# Patient Record
Sex: FEMALE | Race: WHITE
[De-identification: names, ages, dates, MRNs, and addresses within clinical notes are randomized per-mention and may not be internally consistent; named-entity substitution may affect disease eponyms.]

---

## 2019-10-01 NOTE — NUR
S/L TO LEFT WRIST REMOVED PER PATIENT'S REQUEST. NO ERYTHEMA OR INFILTRATION
TO SITE, DRSG APPLIED. NEW IV CATHETER#22 INSERTED TO LFA WITH GOOD BLD
RETURNED AND FLUSEHD WELL. PLAN OF CARE DISCUSSED. REFUSED PAIN MEDS OFFERED
STATED MAYBE LATER.

## 2019-10-01 NOTE — NUR
RECEIVED PATIENT IN BED AWAKE, ALERT AND ORIENTED WITH NO C/O BACKPAIN AT THIS
TIME, PATIENT MEDICATED EARLIER BY AM SHIFT FOR PAIN. BREATHING EASY AND
NONLABOR SATTING AT99% RA. ABDOMEN ROUND AND NOTENDER WITH ACTIVE BS, WILL
CONTINUE TO MONITOR. CALL LIGHT WITHIN REACH.

## 2019-10-01 NOTE — NUR
RECEIVED FROM ER [facility], ABLE TO WALK FROM THE HALLWAY ASSISTED BY 2
NURSES TO BED. ADMITS FOR INTRACTABLE BACK PAIN. STATED BACK PAIN SINCE SUNDAY
WORSEN TODAY, PAIN IS 10/10 ON PAIN SCALE ON MOVEMENT. KEPT COMFORTABLE IN
BED. C/O OF PAIN TO LT WRIST IV SITE, REQUEST TO HAVE IT REMOVED. V/S CHECKED
STABLE, /57, HR 91, RR 20, O2SAT 97% ON ROOM AIR. ORIENTING TO ROOM
ENVIRONMENT. CALL LIGHT PLACED WITHIN EASY REACH, SIDERAILS UP X2.

## 2019-10-01 NOTE — NUR
PTPT BIB [facility] ACL UNIT, PER MEDIC PT HAS BEEN HAVING LOWER BACK PAIN "SINCE
SUNDAY" BUT UPON GETTING OUT OF BED THIS MORNING PT HEARD A "POP" IN HER BED.
PT IS C/O LOWER BACK PAIN RADIDATING TO BILATERAL LOWER EXTREMITIES. PT IS
AAOX4, NO DISTRESS NOTED, RESP E/U, +PMSC, FULL ROM WITH INCREASED PAIN TO
LOWER BACK AND BILATERAL LOWER EXTREMITIES, SKIN INTACT PINK WARM AND DRY. MSE
COMPLETED BY DR HEWITT. PT PLACED ON MONITOR, VSS, BED IN LOWEST POSITION FOR
SAFETY AND CALL SWIFT WITHIN REACH.

## 2019-10-01 NOTE — NUR
PT UNABLE TO WALK MORE THAN A COUPLE STEPS DURING ROAD TEST. PT UNABLE TO STAND
UP STRAIGHT. PT NEEDED ASSISTANCE OUT OF BED AND BEING PLACE BACK ONTO BED. PT
C/O 10/10 PAIN DURING ROAD TEST.

## 2019-10-02 NOTE — NUR
AT 1100 - RECEIVED DISCHARGE ORDERS. PATIENT MAY SHOWER BEFORE DISCHARGE.
AT 1145 - PATIENT HAS SHOWERED. IV CATHETER REMOVED INTACT. PATIENT IS DRESSED
AND SITTING IN CHAIR.
AT 1205 - PRINTED DISCHARGE INSTRUCTIONS GIVEN AND EXPLAINED TO PATIENT.
PRESCRIPTION AND WORK NOTED PROVIDED.
DISCHARGED HOME WITH FAMILY. TAKEN TO CAR IN WHEELCHAIR BY NURSE.

## 2019-10-02 NOTE — NUR
PATIENT SLEPT AT LONG INTERVALS, NO INDICATION OF BACKAPIN AND DISCOMFORT
NOTED THE ENTIRE SHIFT. ALL NEEDS ATTENDED.

## 2019-10-02 NOTE — NUR
PHYSICAL THERAPY NOTE
ATTEMPTED FOR FOLLOW UP PHYSICAL THERAPY SESSION. PATIENT D/C'd BEFORE
TREATMENT.

## 2019-10-02 NOTE — NUR
AT 0705 - RECEIVED PATIENT FROM NIGHT NURSE. SLEEPING. RESPIRATIONS REGULAR.
AT 0740 - SITTING UP IN BED EATING BREAKFAST. REPORTS FEELING A LITTLE BETTER
TODAY. REPORTS THAT SHE STIL HAS PAIN IN LOWER BACK.  ABLE TO AMBULATE TO
BATHROOM.
AT 0810 - MEIDCATED WITH TORADOL AS PER EMAR.
AT 0930 - PATIENT APPEARS MUCH MORE COMFORTABLE.

## 2020-06-25 ENCOUNTER — HOSPITAL ENCOUNTER (EMERGENCY)
Dept: HOSPITAL 1 - ED | Age: 30
LOS: 1 days | Discharge: HOME | End: 2020-06-26
Payer: MEDICAID

## 2020-06-25 VITALS — BODY MASS INDEX: 43.4 KG/M2 | WEIGHT: 293 LBS | HEIGHT: 69 IN

## 2020-06-25 DIAGNOSIS — O99.211: ICD-10-CM

## 2020-06-25 DIAGNOSIS — O26.891: Primary | ICD-10-CM

## 2020-06-25 DIAGNOSIS — Z3A.01: ICD-10-CM

## 2020-06-25 DIAGNOSIS — M54.30: ICD-10-CM

## 2020-06-25 LAB
ALBUMIN SERPL-MCNC: 3.1 G/DL (ref 3.4–5)
ALP SERPL-CCNC: 99 U/L (ref 46–116)
ALT SERPL-CCNC: 33 U/L (ref 14–59)
AST SERPL-CCNC: 22 U/L (ref 15–37)
BASOPHILS NFR BLD: 2.5 % (ref 0–2)
BILIRUB SERPL-MCNC: 0.3 MG/DL (ref 0.2–1)
BUN SERPL-MCNC: 9 MG/DL (ref 7–18)
CALCIUM SERPL-MCNC: 8.5 MG/DL (ref 8.5–10.1)
CHLORIDE SERPL-SCNC: 103 MMOL/L (ref 98–107)
CO2 SERPL-SCNC: 26.9 MMOL/L (ref 21–32)
CREAT SERPL-MCNC: 0.7 MG/DL (ref 0.6–1)
ERYTHROCYTE [DISTWIDTH] IN BLOOD BY AUTOMATED COUNT: 12.9 % (ref 11.5–14.5)
GFR SERPLBLD BASED ON 1.73 SQ M-ARVRAT: > 60 ML/MIN
GLUCOSE SERPL-MCNC: 110 MG/DL (ref 74–106)
PLATELET # BLD: 221 X10^3MCL (ref 130–400)
POTASSIUM SERPL-SCNC: 3.8 MMOL/L (ref 3.5–5.1)
PROT SERPL-MCNC: 6.9 G/DL (ref 6.4–8.2)
SODIUM SERPL-SCNC: 138 MMOL/L (ref 136–145)

## 2020-06-26 VITALS — DIASTOLIC BLOOD PRESSURE: 70 MMHG | SYSTOLIC BLOOD PRESSURE: 129 MMHG
